# Patient Record
Sex: MALE | Race: BLACK OR AFRICAN AMERICAN | NOT HISPANIC OR LATINO | URBAN - METROPOLITAN AREA
[De-identification: names, ages, dates, MRNs, and addresses within clinical notes are randomized per-mention and may not be internally consistent; named-entity substitution may affect disease eponyms.]

---

## 2023-05-08 ENCOUNTER — EMERGENCY (EMERGENCY)
Facility: HOSPITAL | Age: 33
LOS: 1 days | Discharge: HOME | End: 2023-05-08
Admitting: EMERGENCY MEDICINE
Payer: MEDICAID

## 2023-05-08 VITALS
HEART RATE: 114 BPM | OXYGEN SATURATION: 97 % | SYSTOLIC BLOOD PRESSURE: 147 MMHG | TEMPERATURE: 99 F | DIASTOLIC BLOOD PRESSURE: 80 MMHG | RESPIRATION RATE: 18 BRPM

## 2023-05-08 VITALS
OXYGEN SATURATION: 94 % | DIASTOLIC BLOOD PRESSURE: 89 MMHG | RESPIRATION RATE: 20 BRPM | HEART RATE: 106 BPM | TEMPERATURE: 100 F | SYSTOLIC BLOOD PRESSURE: 165 MMHG | WEIGHT: 315 LBS

## 2023-05-08 LAB
HMPV RNA SPEC QL NAA+PROBE: DETECTED
RAPID RVP RESULT: DETECTED
SARS-COV-2 RNA SPEC QL NAA+PROBE: SIGNIFICANT CHANGE UP

## 2023-05-08 PROCEDURE — 71045 X-RAY EXAM CHEST 1 VIEW: CPT | Mod: 26

## 2023-05-08 PROCEDURE — 99284 EMERGENCY DEPT VISIT MOD MDM: CPT

## 2023-05-08 RX ORDER — LORATADINE 10 MG/1
10 TABLET ORAL ONCE
Refills: 0 | Status: COMPLETED | OUTPATIENT
Start: 2023-05-08 | End: 2023-05-08

## 2023-05-08 RX ORDER — IPRATROPIUM/ALBUTEROL SULFATE 18-103MCG
3 AEROSOL WITH ADAPTER (GRAM) INHALATION ONCE
Refills: 0 | Status: COMPLETED | OUTPATIENT
Start: 2023-05-08 | End: 2023-05-08

## 2023-05-08 RX ORDER — DEXAMETHASONE 0.5 MG/5ML
10 ELIXIR ORAL ONCE
Refills: 0 | Status: COMPLETED | OUTPATIENT
Start: 2023-05-08 | End: 2023-05-08

## 2023-05-08 RX ORDER — CEFPODOXIME PROXETIL 100 MG
100 TABLET ORAL ONCE
Refills: 0 | Status: COMPLETED | OUTPATIENT
Start: 2023-05-08 | End: 2023-05-08

## 2023-05-08 RX ORDER — CEFPODOXIME PROXETIL 100 MG
1 TABLET ORAL
Qty: 14 | Refills: 0
Start: 2023-05-08 | End: 2023-05-14

## 2023-05-08 RX ADMIN — Medication 100 MILLIGRAM(S): at 23:46

## 2023-05-08 RX ADMIN — Medication 3 MILLILITER(S): at 21:03

## 2023-05-08 RX ADMIN — LORATADINE 10 MILLIGRAM(S): 10 TABLET ORAL at 21:03

## 2023-05-08 RX ADMIN — Medication 10 MILLIGRAM(S): at 21:03

## 2023-05-08 NOTE — ED PROVIDER NOTE - PROGRESS NOTE DETAILS
Patient reports improvement in symptoms status post serial neb, IM Dex.  Ambulated in the ED without desaturation.  Reports feeling back to his baseline.  Repeat lung exam with improved airflow and no audible wheezing noted.

## 2023-05-08 NOTE — ED PROVIDER NOTE - CARE PROVIDER_API CALL
Alexei Albrecht)  Critical Care Medicine; Internal Medicine; Pulmonary Disease  [address] [place] [address]  Phone: [phone]  Fax: [phone]  Follow Up Time:     Nuvia Bell; MPH)  Internal Medicine  [address] [place] [address]  Phone: [phone]  Fax: [phone]  Follow Up Time:

## 2023-05-08 NOTE — ED ADULT TRIAGE NOTE - CHIEF COMPLAINT QUOTE
pt. c/o asthma exacerbation, pt. received 1 douneb with EMS report still feeling tight. Pt. endorses using his inhaler over 20 times today with no improvement. Pt. breathing unlabored and speaking in full sentences.

## 2023-05-08 NOTE — ED PROVIDER NOTE - PATIENT PORTAL LINK FT
You can access the FollowMyHealth Patient Portal offered by [facility] by registering at the following website: [email or web address]. By joining [employer]’s FollowMyHealth portal, you will also be able to view your health information using other applications (apps) compatible with our system.

## 2023-05-08 NOTE — ED PROVIDER NOTE - OBJECTIVE STATEMENT
34 yo M with PMHx of asthma, no h/o intubation, baseline peak flow unknown, last exacerbation 11/2022 with short hospitalization, seasonal allergies, morbidly obese, BIBA for worsening chest tightness, SOB, wheezing x 2d. Pt reports having gradual onset of cough productive of greenish sputum with associated wheezing, chest tightness. Has been using albuterol pump at home with minimal improvement. Denies fever, chills, wheezing, hemoptysis, CP, orthopnea, palpitations, peripheral edema, stridors, focal weakness, sore throat, tinnitus, HA, dizziness, N/V/D/C, abdominal pain, change in urinary/bowel function, night sweats, and malaise. s/p 2 doses of COVID vaccine, given 1 duoneb en route to the ED by EMS with improvement

## 2023-05-08 NOTE — ED PROVIDER NOTE - PHYSICAL EXAMINATION
Gen - WDWN M, BMI>40, NAD, comfortable and non-toxic appearing  Skin - warm, dry, intact   HEENT - AT/NC, PERRL, EOMI, no nasal discharge, airway patent, neck supple and FROM  CV - S1S2, R/R/R  Resp - mildly diminished airflow b/l with scant expiratory wheezing, no rales or accessory muscle use/tripoding   GI - soft, ND, NT, no CVAT b/l   MS - No acute or gross deformities noted to extremities. No midline spinal tenderness or step off on palpation  Neuro - AxOx3, ambulatory without gait disturbance

## 2023-05-08 NOTE — ED PROVIDER NOTE - NSFOLLOWUPINSTRUCTIONS_ED_ALL_ED_FT
Pneumonia    Pneumonia is an infection of the lungs. Pneumonia may be caused by bacteria, viruses, or funguses. Symptoms include coughing, fever, chest pain when breathing deeply or coughing, shortness of breath, fatigue, or muscle aches. Pneumonia can be diagnosed with a medical history and physical exam, as well as other tests which may include a chest X-ray. If you were prescribed an antibiotic medicine, take it as told by your health care provider and do not stop taking the antibiotic even if you start to feel better. Do not use tobacco products, including cigarettes, chewing tobacco, and e-cigarettes.    SEEK IMMEDIATE MEDICAL CARE IF YOU HAVE ANY OF THE FOLLOWING SYMPTOMS: worsening shortness of breath, worsening chest pain, coughing up blood, change in mental status, lightheadedness/dizziness.     Asthma    Asthma is a condition in which the airways tighten and narrow, making it difficult to breath. Asthma episodes, also called asthma attacks, range from minor to life-threatening. Symptoms include wheezing, coughing, chest tightness, or shortness of breath. The diagnosis of asthma is made by a review of your medical history and a physical exam, but may involve additional testing. Asthma cannot be cured, but medicines and lifestyle changes can help control it. Avoid triggers of asthma which may include animal dander, pollen, mold, smoke, air pollutants, etc.     SEEK IMMEDIATE MEDICAL CARE IF YOU HAVE ANY OF THE FOLLOWING SYMPTOMS: worsening of symptoms, shortness of breath at rest, chest pain, bluish discoloration to lips or fingertips, lightheadedness/dizziness, or fever.

## 2023-05-08 NOTE — ED PROVIDER NOTE - CLINICAL SUMMARY MEDICAL DECISION MAKING FREE TEXT BOX
abnormal Patient with few days of URI symptoms, subjective fever and now worsening chest tightness, wheezing.  Low-grade temp on arrival, diffuse wheezing on exam, status post serial nebs, Dex with marked improvement in symptoms.  Chest x-ray reviewed right upper lobe pneumonia, patient with no travel history or systemic symptoms or constitutional symptoms that are suggestive of TB or ZECHARIAH.  Will treat with course of cefpodoxime and doxycycline, Ambulated into the ED without desaturation, patient reports feeling markedly improved and back to his baseline. Based on my personal interpretation of pt's labs/images and entire work up during the ED stay, results, ddx, and f/u plans discussed in length with pt at bedside. AFVSS, pt non-toxic appearing and remained stable throughout the stay after ED interventions. D/c'd home to f/u with PMD and pulmonary, strict return precautions discussed, prompt return to ED for any worsening or new sx, pt verbalized understanding.

## 2023-05-09 LAB
FLUAV H1 2009 PAND RNA SPEC QL NAA+PROBE: SIGNIFICANT CHANGE UP
FLUAV H1 RNA SPEC QL NAA+PROBE: SIGNIFICANT CHANGE UP
FLUAV H3 RNA SPEC QL NAA+PROBE: SIGNIFICANT CHANGE UP
FLUAV SUBTYP SPEC NAA+PROBE: SIGNIFICANT CHANGE UP
FLUBV RNA SPEC QL NAA+PROBE: SIGNIFICANT CHANGE UP

## 2023-05-10 DIAGNOSIS — Z20.822 CONTACT WITH AND (SUSPECTED) EXPOSURE TO COVID-19: ICD-10-CM

## 2023-05-10 DIAGNOSIS — J18.9 PNEUMONIA, UNSPECIFIED ORGANISM: ICD-10-CM

## 2023-05-10 DIAGNOSIS — J45.909 UNSPECIFIED ASTHMA, UNCOMPLICATED: ICD-10-CM

## 2023-05-10 DIAGNOSIS — R06.02 SHORTNESS OF BREATH: ICD-10-CM

## 2023-05-10 DIAGNOSIS — E66.01 MORBID (SEVERE) OBESITY DUE TO EXCESS CALORIES: ICD-10-CM

## 2023-06-20 ENCOUNTER — EMERGENCY (EMERGENCY)
Facility: HOSPITAL | Age: 33
LOS: 1 days | Discharge: HOME | End: 2023-06-20
Admitting: EMERGENCY MEDICINE
Payer: MEDICAID

## 2023-06-20 VITALS
TEMPERATURE: 97 F | SYSTOLIC BLOOD PRESSURE: 137 MMHG | HEIGHT: 71 IN | DIASTOLIC BLOOD PRESSURE: 90 MMHG | WEIGHT: 315 LBS | HEART RATE: 106 BPM | OXYGEN SATURATION: 99 % | RESPIRATION RATE: 20 BRPM

## 2023-06-20 VITALS
SYSTOLIC BLOOD PRESSURE: 143 MMHG | RESPIRATION RATE: 18 BRPM | OXYGEN SATURATION: 97 % | DIASTOLIC BLOOD PRESSURE: 64 MMHG | HEART RATE: 100 BPM

## 2023-06-20 DIAGNOSIS — Z91.018 ALLERGY TO OTHER FOODS: ICD-10-CM

## 2023-06-20 DIAGNOSIS — J45.901 UNSPECIFIED ASTHMA WITH (ACUTE) EXACERBATION: ICD-10-CM

## 2023-06-20 DIAGNOSIS — R06.02 SHORTNESS OF BREATH: ICD-10-CM

## 2023-06-20 DIAGNOSIS — E87.6 HYPOKALEMIA: ICD-10-CM

## 2023-06-20 PROBLEM — J45.909 UNSPECIFIED ASTHMA, UNCOMPLICATED: Chronic | Status: ACTIVE | Noted: 2023-05-08

## 2023-06-20 PROBLEM — J30.2 OTHER SEASONAL ALLERGIC RHINITIS: Chronic | Status: ACTIVE | Noted: 2023-05-08

## 2023-06-20 LAB
ALBUMIN SERPL ELPH-MCNC: 3.6 G/DL — SIGNIFICANT CHANGE UP (ref 3.4–5)
ALP SERPL-CCNC: 83 U/L — SIGNIFICANT CHANGE UP (ref 40–120)
ALT FLD-CCNC: 21 U/L — SIGNIFICANT CHANGE UP (ref 12–42)
ANION GAP SERPL CALC-SCNC: 11 MMOL/L — SIGNIFICANT CHANGE UP (ref 9–16)
AST SERPL-CCNC: 15 U/L — SIGNIFICANT CHANGE UP (ref 15–37)
BASOPHILS # BLD AUTO: 0.07 K/UL — SIGNIFICANT CHANGE UP (ref 0–0.2)
BASOPHILS NFR BLD AUTO: 0.7 % — SIGNIFICANT CHANGE UP (ref 0–2)
BILIRUB SERPL-MCNC: 0.3 MG/DL — SIGNIFICANT CHANGE UP (ref 0.2–1.2)
BUN SERPL-MCNC: 15 MG/DL — SIGNIFICANT CHANGE UP (ref 7–23)
CALCIUM SERPL-MCNC: 8.5 MG/DL — SIGNIFICANT CHANGE UP (ref 8.5–10.5)
CHLORIDE SERPL-SCNC: 106 MMOL/L — SIGNIFICANT CHANGE UP (ref 96–108)
CO2 SERPL-SCNC: 27 MMOL/L — SIGNIFICANT CHANGE UP (ref 22–31)
CREAT SERPL-MCNC: 1.01 MG/DL — SIGNIFICANT CHANGE UP (ref 0.5–1.3)
EGFR: 99 ML/MIN/1.73M2 — SIGNIFICANT CHANGE UP
EOSINOPHIL # BLD AUTO: 0.78 K/UL — HIGH (ref 0–0.5)
EOSINOPHIL NFR BLD AUTO: 8 % — HIGH (ref 0–6)
GLUCOSE SERPL-MCNC: 112 MG/DL — HIGH (ref 70–99)
HCT VFR BLD CALC: 37.1 % — LOW (ref 39–50)
HGB BLD-MCNC: 10.9 G/DL — LOW (ref 13–17)
IMM GRANULOCYTES NFR BLD AUTO: 0.5 % — SIGNIFICANT CHANGE UP (ref 0–0.9)
LYMPHOCYTES # BLD AUTO: 2.56 K/UL — SIGNIFICANT CHANGE UP (ref 1–3.3)
LYMPHOCYTES # BLD AUTO: 26.4 % — SIGNIFICANT CHANGE UP (ref 13–44)
MCHC RBC-ENTMCNC: 24.8 PG — LOW (ref 27–34)
MCHC RBC-ENTMCNC: 29.4 GM/DL — LOW (ref 32–36)
MCV RBC AUTO: 84.3 FL — SIGNIFICANT CHANGE UP (ref 80–100)
MONOCYTES # BLD AUTO: 1.16 K/UL — HIGH (ref 0–0.9)
MONOCYTES NFR BLD AUTO: 12 % — SIGNIFICANT CHANGE UP (ref 2–14)
NEUTROPHILS # BLD AUTO: 5.08 K/UL — SIGNIFICANT CHANGE UP (ref 1.8–7.4)
NEUTROPHILS NFR BLD AUTO: 52.4 % — SIGNIFICANT CHANGE UP (ref 43–77)
NRBC # BLD: 0 /100 WBCS — SIGNIFICANT CHANGE UP (ref 0–0)
PCO2 BLDV: 48 MMHG — SIGNIFICANT CHANGE UP (ref 42–55)
PH BLDV: 7.4 — SIGNIFICANT CHANGE UP (ref 7.32–7.43)
PLATELET # BLD AUTO: 295 K/UL — SIGNIFICANT CHANGE UP (ref 150–400)
PO2 BLDV: 46 MMHG — HIGH (ref 25–45)
POTASSIUM SERPL-MCNC: 3.1 MMOL/L — LOW (ref 3.5–5.3)
POTASSIUM SERPL-SCNC: 3.1 MMOL/L — LOW (ref 3.5–5.3)
PROT SERPL-MCNC: 7.5 G/DL — SIGNIFICANT CHANGE UP (ref 6.4–8.2)
RBC # BLD: 4.4 M/UL — SIGNIFICANT CHANGE UP (ref 4.2–5.8)
RBC # FLD: 15.4 % — HIGH (ref 10.3–14.5)
SAO2 % BLDV: 75.2 % — SIGNIFICANT CHANGE UP (ref 67–88)
SODIUM SERPL-SCNC: 144 MMOL/L — SIGNIFICANT CHANGE UP (ref 132–145)
WBC # BLD: 9.7 K/UL — SIGNIFICANT CHANGE UP (ref 3.8–10.5)
WBC # FLD AUTO: 9.7 K/UL — SIGNIFICANT CHANGE UP (ref 3.8–10.5)

## 2023-06-20 PROCEDURE — 99284 EMERGENCY DEPT VISIT MOD MDM: CPT

## 2023-06-20 RX ORDER — POTASSIUM CHLORIDE 20 MEQ
40 PACKET (EA) ORAL ONCE
Refills: 0 | Status: COMPLETED | OUTPATIENT
Start: 2023-06-20 | End: 2023-06-20

## 2023-06-20 RX ORDER — LORATADINE 10 MG/1
10 TABLET ORAL ONCE
Refills: 0 | Status: COMPLETED | OUTPATIENT
Start: 2023-06-20 | End: 2023-06-20

## 2023-06-20 RX ORDER — ALBUTEROL 90 UG/1
2 AEROSOL, METERED ORAL
Qty: 1 | Refills: 0
Start: 2023-06-20

## 2023-06-20 RX ORDER — FLUTICASONE PROPIONATE AND SALMETEROL 50; 250 UG/1; UG/1
1 POWDER ORAL; RESPIRATORY (INHALATION)
Qty: 1 | Refills: 0
Start: 2023-06-20

## 2023-06-20 RX ORDER — IPRATROPIUM/ALBUTEROL SULFATE 18-103MCG
3 AEROSOL WITH ADAPTER (GRAM) INHALATION ONCE
Refills: 0 | Status: COMPLETED | OUTPATIENT
Start: 2023-06-20 | End: 2023-06-20

## 2023-06-20 RX ORDER — LEVOCETIRIZINE DIHYDROCHLORIDE 0.5 MG/ML
1 SOLUTION ORAL
Qty: 7 | Refills: 0
Start: 2023-06-20 | End: 2023-06-26

## 2023-06-20 RX ADMIN — Medication 40 MILLIEQUIVALENT(S): at 01:30

## 2023-06-20 RX ADMIN — LORATADINE 10 MILLIGRAM(S): 10 TABLET ORAL at 00:31

## 2023-06-20 RX ADMIN — Medication 3 MILLILITER(S): at 00:21

## 2023-06-20 RX ADMIN — Medication 0.25 MILLIGRAM(S): at 00:31

## 2023-06-20 RX ADMIN — Medication 3 MILLILITER(S): at 00:32

## 2023-06-20 NOTE — ED ADULT NURSE REASSESSMENT NOTE - NS ED NURSE REASSESS COMMENT FT1
Pt reports improvement of condition, states "I feel less tight in my chest". Nebulizer treatment completed. Cardiac and pulse oximetry monitoring maintained.

## 2023-06-20 NOTE — ED PROVIDER NOTE - CARE PROVIDER_API CALL
Alxeei Albrecht [facility]  Pulmonary Disease  [address] [place] [address], [place] [address]  Phone: [phone]  Fax: [phone]  Follow Up Time:

## 2023-06-20 NOTE — ED ADULT NURSE NOTE - NSFALLUNIVINTERV_ED_ALL_ED
Bed/Stretcher in lowest position, wheels locked, appropriate side rails in place/Call bell, personal items and telephone in reach/Instruct patient to call for assistance before getting out of bed/chair/stretcher/Non-slip footwear applied when patient is off stretcher/[place] to call system/Physically safe environment - no spills, clutter or unnecessary equipment/Purposeful proactive rounding/Room/bathroom lighting operational, light cord in reach

## 2023-06-20 NOTE — ED PROVIDER NOTE - CARE PLAN
1 Principal Discharge DX:	Asthma exacerbation   Principal Discharge DX:	Asthma exacerbation  Secondary Diagnosis:	Hypokalemia

## 2023-06-20 NOTE — ED PROVIDER NOTE - OBJECTIVE STATEMENT
32 yo M with PMHx of asthma, no h/o intubation, baseline peak flow unknown, last exacerbation 3 months ago without hospitalization, BIBA for SOB and wheezing. Pt reports sudden onset of chest tightness, wheezing and difficulty breathing tonight. Attempted alleviation of albuterol inhaler without relief and called EMS. Given 2 duo nebs, dex 12mg, and 2mg of Mg en route to the ED with improvement. Denies fever, chills, hemoptysis, CP, orthopnea, palpitations, peripheral edema, stridors, focal weakness, sore throat, tinnitus, HA, dizziness, N/V/D/C, abdominal pain, change in urinary/bowel function, night sweats, and malaise. No recent travel or sick contact noted

## 2023-06-20 NOTE — ED PROVIDER NOTE - PHYSICAL EXAMINATION
Gen - Obese M, slightly tachypneic, comfortable and non-toxic appearing, speaking in full sentences   Skin - warm, dry, intact   HEENT - AT/NC, PERRL, no conjunctival injection, no nasal discharge, airway patent, neck supple and FROM  CV - S1S2, R/R/R  Resp - slightly diminished bs b/l with expiratory and inspiratory wheezing, no r/r, no tripoding or accessory muscle use   GI - soft, ND, NT, no CVAT b/l   MS - No acute or gross deformities noted to extremities. No midline spinal tenderness or step off on palpation  Neuro - AxOx3, no focal neuro deficits, ambulatory without gait disturbance

## 2023-06-20 NOTE — ED ADULT NURSE NOTE - OBJECTIVE STATEMENT
Pt is a 33y male c/o asthma exacerbation. Pt states he kept coughing and was unable to catch his breath despite using his inhaler, he began to feel lightheaded then called EMS. PTA, EMS placed IV in LAC, gave 2 dounebs, 12mg decadron, and 2grams of magnesium. Pt states on arrival to ED he was already feeling better, with some chest tightness remaining. Pt arrived with nebulizer treatment in progress, speaking in complete sentences. PMH asthma, no reported hx of intubations.

## 2023-06-20 NOTE — ED PROVIDER NOTE - PROGRESS NOTE DETAILS
reports feeling better, scant wheezing on exam, AFVSS, currently awaiting labs ambulatory in the ED without desaturation, repeat lung exam CTAB, reports feeling back to baseline and desires to go home.

## 2023-06-20 NOTE — ED ADULT TRIAGE NOTE - CHIEF COMPLAINT QUOTE
pt. notification for asthma exacerbation. Reports coughing started about 1 hr ago while on the train and he began feeling SOB with no improvement from his albuterol. Pt. given 2 dounebs, 12mg decadron, and 2grams of magnesium from EMS. Pt. appears well speaking in full sentences but endorses some chest tightness.

## 2023-06-20 NOTE — ED PROVIDER NOTE - CLINICAL SUMMARY MEDICAL DECISION MAKING FREE TEXT BOX
pt with chest tightness, wheezing and sob since few hours ago. Noted diffuse inspiratory and expiratory wheezing on exam, non hypoxic, moving air well s/p serial nebs, prehospital dex/Mg. Additional terbutaline given in the ED and pt monitored with improvement in sx and ambulated in the ED without desaturation noted. labs unremarkable except for mild hypokalemia likely 2/2 serial albuterol tx, s/p po supplementation. Based on my personal interpretation of pt's labs/images and entire work up during the ED stay, results, ddx, and f/u plans discussed in length with pt at bedside. AFVSS, pt non-toxic appearing and remained stable throughout the stay after ED interventions. D/c'd home to f/u with pulmonary/PMD, strict return precautions discussed, prompt return to ED for any worsening or new sx, pt verbalized understanding.

## 2023-06-20 NOTE — ED PROVIDER NOTE - PATIENT PORTAL LINK FT
You can access the FollowMyHealth Patient Portal offered by [facility] by registering at the following website: [email or web address]. By joining [employer]’s FollowMyHealth portal, you will also be able to view your health information using other applications (apps) compatible with our system.

## 2023-07-11 ENCOUNTER — APPOINTMENT (OUTPATIENT)
Dept: RADIOLOGY | Facility: CLINIC | Age: 33
End: 2023-07-11
Payer: MEDICAID

## 2023-07-11 ENCOUNTER — APPOINTMENT (OUTPATIENT)
Dept: PULMONOLOGY | Facility: CLINIC | Age: 33
End: 2023-07-11
Payer: MEDICAID

## 2023-07-11 ENCOUNTER — OUTPATIENT (OUTPATIENT)
Dept: OUTPATIENT SERVICES | Facility: HOSPITAL | Age: 33
LOS: 1 days | End: 2023-07-11

## 2023-07-11 VITALS
DIASTOLIC BLOOD PRESSURE: 76 MMHG | OXYGEN SATURATION: 98 % | WEIGHT: 315 LBS | BODY MASS INDEX: 44.1 KG/M2 | HEART RATE: 113 BPM | SYSTOLIC BLOOD PRESSURE: 131 MMHG | HEIGHT: 71 IN | TEMPERATURE: 98.8 F

## 2023-07-11 PROCEDURE — 94727 GAS DIL/WSHOT DETER LNG VOL: CPT

## 2023-07-11 PROCEDURE — 99204 OFFICE O/P NEW MOD 45 MIN: CPT | Mod: 25

## 2023-07-11 PROCEDURE — ZZZZZ: CPT

## 2023-07-11 PROCEDURE — 71046 X-RAY EXAM CHEST 2 VIEWS: CPT | Mod: 26

## 2023-07-11 PROCEDURE — 94060 EVALUATION OF WHEEZING: CPT

## 2023-07-11 PROCEDURE — 94729 DIFFUSING CAPACITY: CPT

## 2023-07-11 NOTE — PHYSICAL EXAM
[No Acute Distress] : no acute distress [Normal Appearance] : normal appearance [Normal Rate/Rhythm] : normal rate/rhythm [Normal S1, S2] : normal s1, s2 [No Resp Distress] : no resp distress [Clear to Auscultation Bilaterally] : clear to auscultation bilaterally [Oriented x3] : oriented x3 [Normal Affect] : normal affect [Erythema] : erythema [Nasal congestion] : nasal congestion [Turbinate hypertrophy] : turbinate hypertrophy

## 2023-07-12 NOTE — HISTORY OF PRESENT ILLNESS
[Never] : never [TextBox_4] : 34 yo M, never smoker, with PMHx of asthma, no h/o intubation, recently seen in the ED at [facility] 5/23 for PNA and asthma exacerbation, here to establish care for management of asthma. During hospitalization, respiratory viral panel was positive for hMPV and CXR done showed RUL infiltrate. Given dexamethasone and duonebs, treated for CAP with course of cefpodoxime/doxy. Patient is a . Notes allergies to pollen. Takes Flonase once daily and Zyrtec. No dogs/cats. Currently living at homeless shelter- walls are moldy, many people smoking which he feels may be triggering asthma. Since January, states that his asthma has worsened. Used albuterol nebulizer twice in past week, albuterol inhaler about 10 x in the past week. Does not have a maintenance inhaler. Had PNA when he was 22; states he developed asthma after that time.  No GERD.

## 2023-07-12 NOTE — REVIEW OF SYSTEMS
120 [Nasal Congestion] : nasal congestion [Postnasal Drip] : postnasal drip [Chest Tightness] : chest tightness [Dyspnea] : dyspnea [Wheezing] : wheezing [Fever] : no fever [Chills] : no chills [Dry Eyes] : no dry eyes [Eye Irritation] : no eye irritation [Cough] : no cough [Sputum] : no sputum [SOB on Exertion] : no sob on exertion [Chest Discomfort] : no chest discomfort [Edema] : no edema [Orthopnea] : no orthopnea [Hay Fever] : no hay fever [GERD] : no gerd

## 2023-07-12 NOTE — DISCUSSION/SUMMARY
[FreeTextEntry1] : 32 yo M, never smoker, with PMHx of asthma, recent [facility] ED visit [facility] 5/23 for PNA and asthma exacerbation, currently living in homeless shelter with possible mold/cigarette smoke exposures, here to establish care for management of asthma. \par \par Reviewed\par [facility] ED notes 5/23\par PFT (7/11/23): FEV 1 54, FEV1/FVC 64, TLC 74 , DLCO 78  , REV -2\par 6/20/23: Bicarb 27, Eosinophilia 8%, WBC 9.70\par CXR 5/23:Single frontal view of the chest demonstrates right upper lobe infiltrate. The cardiomediastinal silhouette is normal. No acute osseous abnormalities. \par CXR 7/11/23: Interval resolution of previous focal consolidation within RUL \par \par A/P: \par (1) Asthma: Currently uncontrolled, using albuterol inhaler 10x per week.\par - Start Breo 1 puff daily with gargle \par - Continue Flonase, advised to increase to BID \par - Continue albuterol inhaler and nebulizer PRN\par \par (2) RUL infiltrate; Patient seen at [facility] ED 5/23 for PNA, CXR at the time with RUL infiltrate that has resolved on repeat CXR today. Respiratory viral panel done at the time was positive for hMPV and patient completed course of cefpodoxime/doxy for CAP. \par \par Follow-up in 1 month with Dr. Birmingham

## 2023-08-07 NOTE — DISCUSSION/SUMMARY
[FreeTextEntry1] : 32 yo M, never smoker, with PMHx of asthma, recent [facility] ED visit [facility] 5/23 for PNA and asthma exacerbation, currently living in homeless shelter with possible mold/cigarette smoke exposures, here to establish care for management of asthma.  Reviewed [facility] ED notes 5/23 PFT (7/11/23): FEV 1 54, FEV1/FVC 64, TLC 74 , DLCO 78 , REV -2 6/20/23: Bicarb 27, Eosinophilia 8%, WBC 9.70 CXR 5/23:Single frontal view of the chest demonstrates right upper lobe infiltrate. The cardiomediastinal silhouette is normal. No acute osseous abnormalities. CXR 7/11/23: Interval resolution of previous focal consolidation within RUL  A/P: (1) Asthma: Currently uncontrolled, using albuterol inhaler 10x per week. - Start Breo 1 puff daily with gargle - Continue Flonase, advised to increase to BID - Continue albuterol inhaler and nebulizer PRN  (2) RUL infiltrate; Patient seen at [facility] ED 5/23 for PNA, CXR at the time with RUL infiltrate that has resolved on repeat CXR today. Respiratory viral panel done at the time was positive for hMPV and patient completed course of cefpodoxime/doxy for CAP.  Follow-up in 1 month with Dr. Birmingham.

## 2023-08-09 ENCOUNTER — APPOINTMENT (OUTPATIENT)
Dept: PULMONOLOGY | Facility: CLINIC | Age: 33
End: 2023-08-09
Payer: MEDICAID

## 2023-08-09 VITALS
HEIGHT: 71 IN | OXYGEN SATURATION: 90 % | BODY MASS INDEX: 44.1 KG/M2 | DIASTOLIC BLOOD PRESSURE: 78 MMHG | WEIGHT: 315 LBS | SYSTOLIC BLOOD PRESSURE: 139 MMHG | HEART RATE: 97 BPM | TEMPERATURE: 98.6 F

## 2023-08-09 DIAGNOSIS — Z87.01 PERSONAL HISTORY OF PNEUMONIA (RECURRENT): ICD-10-CM

## 2023-08-09 PROCEDURE — 99214 OFFICE O/P EST MOD 30 MIN: CPT

## 2023-08-09 NOTE — REVIEW OF SYSTEMS
[Nasal Congestion] : nasal congestion [Postnasal Drip] : postnasal drip [Chest Tightness] : chest tightness [Wheezing] : wheezing [Negative] : Endocrine [Fever] : no fever [Chills] : no chills [Dry Eyes] : no dry eyes [Eye Irritation] : no eye irritation [Cough] : no cough [Sputum] : no sputum [SOB on Exertion] : no sob on exertion [Chest Discomfort] : no chest discomfort [Edema] : no edema [Orthopnea] : no orthopnea [Hay Fever] : no hay fever [GERD] : no gerd

## 2023-08-09 NOTE — ASSESSMENT
[FreeTextEntry1] : Reviewed [facility] ED notes 5/23 PFT (7/11/23): FEV 1 54, FEV1/FVC 64, TLC 74, DLCO 78, REV -2 6/20/23: Bicarb 27, Eosinophilia 8%, WBC 9.70 CXR 5/23: Single frontal view of the chest demonstrates right upper lobe infiltrate. The cardiomediastinal silhouette is normal. No acute osseous abnormalities. CXR 7/11/23: Interval resolution of previous focal consolidation within RUL  32 yo M, never smoker, with PMHx of asthma, who presented to establish care for asthma after an ED visit [facility] 5/23 for PNA and asthma exacerbation. Asthma is well controlled.  A/P: (1) Mild persistent asthma- currently well controlled - Presented to us in exacerbation 2/2 hMPV and CAP in 5/2023 - Continue Breo 1 puff daily with gargle - Continue Flonase BID - Continue albuterol inhaler and nebulizer PRN - Will plan to repeat PFTs 11/2023 and consider step down therapy at that time, also consider repeat eosinophils at that time  (2) RUL infiltrate 5/2023 resolved on imaging 7/11/2023- in setting of hMPV and CAP.  Follow-up in 3 months.

## 2023-08-09 NOTE — HISTORY OF PRESENT ILLNESS
[Never] : never [TextBox_4] : 34 yo M, never smoker, with PMHx of asthma, no h/o intubation, recently seen in the ED at [facility] 5/23 for PNA and asthma exacerbation, here to establish care for management of asthma. During hospitalization, respiratory viral panel was positive for hMPV and CXR done showed RUL infiltrate. Given dexamethasone and duonebs, treated for CAP with course of cefpodoxime/doxy. Patient is a . Notes allergies to pollen. Takes Flonase once daily and Zyrtec. No dogs/cats. Currently living at homeless shelter- walls are moldy, many people smoking which he feels may be triggering asthma. Since January, states that his asthma has worsened. Used albuterol nebulizer twice in past week, albuterol inhaler about 10 x in the past week. Does not have a maintenance inhaler. Had PNA when he was 22; states he developed asthma after that time. No GERD.    8/9/23: Feels well. Using Breo daily. Rinsing afterwards. No exacerbations since last visit. Has required his albuterol nebulizer only once. Further history suggesting his triggers are cold air, season changes, animal and cat dander, cigarette smoke. He is using his Flonase daily. Montelukast infrequently.  [ESS] : 0

## 2023-08-09 NOTE — PHYSICAL EXAM
[No Acute Distress] : no acute distress [Normal Oropharynx] : normal oropharynx [Normal Appearance] : normal appearance [Normal Rate/Rhythm] : normal rate/rhythm [Normal S1, S2] : normal s1, s2 [No Resp Distress] : no resp distress [Clear to Auscultation Bilaterally] : clear to auscultation bilaterally [No Clubbing] : no clubbing [No Edema] : no edema [Oriented x3] : oriented x3 [Normal Affect] : normal affect [Normal Gait] : normal gait [No Acc Muscle Use] : no acc muscle use

## 2023-11-13 ENCOUNTER — APPOINTMENT (OUTPATIENT)
Dept: PULMONOLOGY | Facility: CLINIC | Age: 33
End: 2023-11-13
Payer: MEDICAID

## 2023-11-13 VITALS
TEMPERATURE: 97.5 F | HEIGHT: 70 IN | DIASTOLIC BLOOD PRESSURE: 87 MMHG | HEART RATE: 83 BPM | OXYGEN SATURATION: 93 % | SYSTOLIC BLOOD PRESSURE: 139 MMHG | WEIGHT: 315 LBS | BODY MASS INDEX: 45.1 KG/M2

## 2023-11-13 PROCEDURE — 94729 DIFFUSING CAPACITY: CPT

## 2023-11-13 PROCEDURE — 94726 PLETHYSMOGRAPHY LUNG VOLUMES: CPT

## 2023-11-13 PROCEDURE — 94060 EVALUATION OF WHEEZING: CPT

## 2023-11-13 PROCEDURE — 99214 OFFICE O/P EST MOD 30 MIN: CPT | Mod: 25

## 2023-11-13 RX ORDER — FLUTICASONE PROPIONATE 50 UG/1
50 SPRAY, METERED NASAL TWICE DAILY
Qty: 1 | Refills: 5 | Status: ACTIVE | COMMUNITY
Start: 2023-07-11 | End: 1900-01-01

## 2024-05-06 NOTE — PHYSICAL EXAM
[No Acute Distress] : no acute distress [Normal Appearance] : normal appearance [No Resp Distress] : no resp distress [Rhonchi] : rhonchi [Normal Gait] : normal gait [Oriented x3] : oriented x3 [Normal Affect] : normal affect

## 2024-05-13 ENCOUNTER — APPOINTMENT (OUTPATIENT)
Dept: PULMONOLOGY | Facility: CLINIC | Age: 34
End: 2024-05-13
Payer: MEDICAID

## 2024-05-13 VITALS
OXYGEN SATURATION: 97 % | HEART RATE: 96 BPM | BODY MASS INDEX: 45.1 KG/M2 | DIASTOLIC BLOOD PRESSURE: 69 MMHG | TEMPERATURE: 98.6 F | WEIGHT: 315 LBS | HEIGHT: 70 IN | SYSTOLIC BLOOD PRESSURE: 129 MMHG

## 2024-05-13 PROCEDURE — 99214 OFFICE O/P EST MOD 30 MIN: CPT

## 2024-05-13 RX ORDER — FLUTICASONE FUROATE AND VILANTEROL TRIFENATATE 200; 25 UG/1; UG/1
200-25 POWDER RESPIRATORY (INHALATION)
Qty: 2 | Refills: 2 | Status: ACTIVE | COMMUNITY
Start: 2023-07-11 | End: 1900-01-01

## 2024-05-13 NOTE — REVIEW OF SYSTEMS
[Negative] : Endocrine [Nasal Congestion] : no nasal congestion [Postnasal Drip] : no postnasal drip [Cough] : no cough [Chest Tightness] : no chest tightness [Sputum] : no sputum [Dyspnea] : no dyspnea [Wheezing] : no wheezing [SOB on Exertion] : no sob on exertion [GERD] : no gerd

## 2024-05-13 NOTE — HISTORY OF PRESENT ILLNESS
[Never] : never [TextBox_4] : 32 yo M, never smoker, with PMHx of asthma, no h/o intubation, recently seen in the ED at [facility] 5/23 for PNA and asthma exacerbation, here to establish care for management of asthma. During hospitalization, respiratory viral panel was positive for hMPV and CXR done showed RUL infiltrate. Given dexamethasone and duonebs, treated for CAP with course of cefpodoxime/doxy. Patient is a . Notes allergies to pollen. Takes Flonase once daily and Zyrtec. No dogs/cats. Currently living at homeless shelter- walls are moldy, many people smoking which he feels may be triggering asthma. Since January, states that his asthma has worsened. Used albuterol nebulizer twice in past week, albuterol inhaler about 10 x in the past week. Does not have a maintenance inhaler. Had PNA when he was 22; states he developed asthma after that time. No GERD.  8/9/2023 Feels well. Using Breo daily. Rinsing afterwards. No exacerbations since last visit. Has required his albuterol nebulizer only once. Further history suggesting his triggers are cold air, season changes, animal and cat dander, cigarette smoke. He is using his Flonase daily. Montelukast infrequently.  11/13/2023 PFTs today. Significant improvement in asthma symptoms since starting Breo. Moving out of shelter soon.   5/13/2024 No exacerbations. Has been using Breo on/off intermittently.  [ESS] : 0

## 2024-05-13 NOTE — ASSESSMENT
[FreeTextEntry1] : Reviewed [facility] ED notes 5/23 PFT (7/11/23): FEV 1 54, FEV1/FVC 64, TLC 74, DLCO 78, REV -2 6/20/23: Bicarb 27, Eosinophilia 8%, WBC 9.70 CXR 5/23: Single frontal view of the chest demonstrates right upper lobe infiltrate. The cardiomediastinal silhouette is normal. No acute osseous abnormalities. CXR 7/11/23: Interval resolution of previous focal consolidation within RUL PFTs (11/13/23): FEV 1 62, FEV1/FVC 64, TLC 56, DLCO 73  33 yo M, never smoker, with PMHx of asthma, who presented to establish care for asthma after an ED visit [facility] 5/23 for PNA and asthma exacerbation.   A/P: (1) Mild persistent asthma - Presented to us in exacerbation 2/2 hMPV and CAP in 5/2023 - Should resume daily use of Breo given increased PRN use as of late; gargle after use - Continue albuterol inhaler and nebulizer PRN  (2) RUL infiltrate 5/2023 resolved on imaging 7/11/2023- in setting of hMPV and CAP.  Follow-up in 6 months.

## 2024-06-04 ENCOUNTER — APPOINTMENT (OUTPATIENT)
Dept: INTERNAL MEDICINE | Facility: CLINIC | Age: 34
End: 2024-06-04
Payer: MEDICAID

## 2024-06-04 VITALS
OXYGEN SATURATION: 98 % | TEMPERATURE: 97.4 F | HEIGHT: 70 IN | BODY MASS INDEX: 45.1 KG/M2 | HEART RATE: 81 BPM | RESPIRATION RATE: 18 BRPM | DIASTOLIC BLOOD PRESSURE: 75 MMHG | SYSTOLIC BLOOD PRESSURE: 122 MMHG | WEIGHT: 315 LBS

## 2024-06-04 DIAGNOSIS — R91.8 OTHER NONSPECIFIC ABNORMAL FINDING OF LUNG FIELD: ICD-10-CM

## 2024-06-04 DIAGNOSIS — J45.909 UNSPECIFIED ASTHMA, UNCOMPLICATED: ICD-10-CM

## 2024-06-04 DIAGNOSIS — Z80.3 FAMILY HISTORY OF MALIGNANT NEOPLASM OF BREAST: ICD-10-CM

## 2024-06-04 DIAGNOSIS — Z82.49 FAMILY HISTORY OF ISCHEMIC HEART DISEASE AND OTHER DISEASES OF THE CIRCULATORY SYSTEM: ICD-10-CM

## 2024-06-04 DIAGNOSIS — Z23 ENCOUNTER FOR IMMUNIZATION: ICD-10-CM

## 2024-06-04 DIAGNOSIS — Z80.42 FAMILY HISTORY OF MALIGNANT NEOPLASM OF PROSTATE: ICD-10-CM

## 2024-06-04 PROCEDURE — 99385 PREV VISIT NEW AGE 18-39: CPT | Mod: 25

## 2024-06-04 PROCEDURE — 90715 TDAP VACCINE 7 YRS/> IM: CPT

## 2024-06-04 PROCEDURE — 90471 IMMUNIZATION ADMIN: CPT

## 2024-06-04 RX ORDER — SEMAGLUTIDE 0.25 MG/.5ML
0.25 INJECTION, SOLUTION SUBCUTANEOUS
Qty: 1 | Refills: 0 | Status: ACTIVE | COMMUNITY
Start: 2024-06-04 | End: 1900-01-01

## 2024-06-04 NOTE — PHYSICAL EXAM
[No Acute Distress] : no acute distress [Normal Sclera/Conjunctiva] : normal sclera/conjunctiva [PERRL] : pupils equal round and reactive to light [EOMI] : extraocular movements intact [Normal Outer Ear/Nose] : the outer ears and nose were normal in appearance [Normal Oropharynx] : the oropharynx was normal [No JVD] : no jugular venous distention [No Lymphadenopathy] : no lymphadenopathy [Supple] : supple [Thyroid Normal, No Nodules] : the thyroid was normal and there were no nodules present [No Respiratory Distress] : no respiratory distress  [No Accessory Muscle Use] : no accessory muscle use [Clear to Auscultation] : lungs were clear to auscultation bilaterally [Normal Rate] : normal rate  [Regular Rhythm] : with a regular rhythm [Normal S1, S2] : normal S1 and S2 [No Murmur] : no murmur heard [No Carotid Bruits] : no carotid bruits [No Abdominal Bruit] : a ~M bruit was not heard ~T in the abdomen [No Varicosities] : no varicosities [Pedal Pulses Present] : the pedal pulses are present [No Edema] : there was no peripheral edema [No Palpable Aorta] : no palpable aorta [No Extremity Clubbing/Cyanosis] : no extremity clubbing/cyanosis [Soft] : abdomen soft [Non Tender] : non-tender [Non-distended] : non-distended [No Masses] : no abdominal mass palpated [No HSM] : no HSM [Normal Bowel Sounds] : normal bowel sounds [Normal Posterior Cervical Nodes] : no posterior cervical lymphadenopathy [No CVA Tenderness] : no CVA  tenderness [Normal Anterior Cervical Nodes] : no anterior cervical lymphadenopathy [No Spinal Tenderness] : no spinal tenderness [No Joint Swelling] : no joint swelling [Grossly Normal Strength/Tone] : grossly normal strength/tone [No Rash] : no rash [Coordination Grossly Intact] : coordination grossly intact [No Focal Deficits] : no focal deficits [Normal Gait] : normal gait [Deep Tendon Reflexes (DTR)] : deep tendon reflexes were 2+ and symmetric [Normal Affect] : the affect was normal [Normal Insight/Judgement] : insight and judgment were intact

## 2024-06-04 NOTE — HEALTH RISK ASSESSMENT
[Good] : ~his/her~  mood as  good [0] : 2) Feeling down, depressed, or hopeless: Not at all (0) [Never] : Never [[id number]] : 0

## 2024-06-04 NOTE — HISTORY OF PRESENT ILLNESS
[FreeTextEntry1] : No new complains.  [de-identified] : 34 yrs old M with pmx of asthma, lung infiltrate, obesity comes in to establish care and for annual exam. Asthma: follows with Pulm, last seen 05/2024, on inhaler breo Ellipta and as needed albuterol.  says asthma is controlled these days. No other complains.  sleep- good appetite- good mood- good  covid- 2 doses tdap- today STD- today skin-  no lesions.

## 2024-06-12 DIAGNOSIS — E55.9 VITAMIN D DEFICIENCY, UNSPECIFIED: ICD-10-CM

## 2024-06-12 DIAGNOSIS — D64.9 ANEMIA, UNSPECIFIED: ICD-10-CM

## 2024-06-12 LAB
25(OH)D3 SERPL-MCNC: 9.6 NG/ML
ALBUMIN SERPL ELPH-MCNC: 4.1 G/DL
ALP BLD-CCNC: 87 U/L
ALT SERPL-CCNC: 12 U/L
ANION GAP SERPL CALC-SCNC: 12 MMOL/L
APPEARANCE: CLEAR
AST SERPL-CCNC: 8 U/L
BACTERIA: NEGATIVE /HPF
BILIRUB SERPL-MCNC: 0.4 MG/DL
BILIRUBIN URINE: NEGATIVE
BLOOD URINE: NEGATIVE
BUN SERPL-MCNC: 15 MG/DL
C TRACH RRNA SPEC QL NAA+PROBE: NOT DETECTED
CALCIUM SERPL-MCNC: 9.2 MG/DL
CAST: 0 /LPF
CHLORIDE SERPL-SCNC: 104 MMOL/L
CHOLEST SERPL-MCNC: 182 MG/DL
CO2 SERPL-SCNC: 26 MMOL/L
COLOR: YELLOW
CREAT SERPL-MCNC: 0.94 MG/DL
EGFR: 108 ML/MIN/1.73M2
EPITHELIAL CELLS: 0 /HPF
ESTIMATED AVERAGE GLUCOSE: 94 MG/DL
GLUCOSE QUALITATIVE U: NEGATIVE MG/DL
GLUCOSE SERPL-MCNC: 92 MG/DL
HBA1C MFR BLD HPLC: 4.9 %
HBV CORE IGG+IGM SER QL: NONREACTIVE
HBV SURFACE AB SER QL: REACTIVE
HBV SURFACE AG SER QL: NONREACTIVE
HCT VFR BLD CALC: 39.9 %
HCV AB SER QL: NONREACTIVE
HCV S/CO RATIO: 0.09 S/CO
HDLC SERPL-MCNC: 38 MG/DL
HGB BLD-MCNC: 11.6 G/DL
KETONES URINE: NEGATIVE MG/DL
LDLC SERPL CALC-MCNC: 132 MG/DL
LEUKOCYTE ESTERASE URINE: NEGATIVE
MCHC RBC-ENTMCNC: 25.4 PG
MCHC RBC-ENTMCNC: 29.1 GM/DL
MCV RBC AUTO: 87.5 FL
MICROSCOPIC-UA: NORMAL
N GONORRHOEA RRNA SPEC QL NAA+PROBE: NOT DETECTED
NITRITE URINE: NEGATIVE
NONHDLC SERPL-MCNC: 144 MG/DL
PH URINE: 5.5
PLATELET # BLD AUTO: 327 K/UL
POTASSIUM SERPL-SCNC: 4.6 MMOL/L
PROT SERPL-MCNC: 7.1 G/DL
PROTEIN URINE: NEGATIVE MG/DL
RBC # BLD: 4.56 M/UL
RBC # FLD: 14.6 %
RED BLOOD CELLS URINE: 0 /HPF
SODIUM SERPL-SCNC: 142 MMOL/L
SOURCE AMPLIFICATION: NORMAL
SPECIFIC GRAVITY URINE: 1.02
T PALLIDUM AB SER QL IA: NEGATIVE
TRIGL SERPL-MCNC: 64 MG/DL
TSH SERPL-ACNC: 0.6 UIU/ML
UROBILINOGEN URINE: 0.2 MG/DL
WBC # FLD AUTO: 7.82 K/UL
WHITE BLOOD CELLS URINE: 0 /HPF

## 2024-06-12 RX ORDER — ERGOCALCIFEROL 1.25 MG/1
1.25 MG CAPSULE, LIQUID FILLED ORAL
Qty: 4 | Refills: 1 | Status: ACTIVE | COMMUNITY
Start: 2024-06-12 | End: 1900-01-01

## 2024-06-18 LAB
BASOPHILS # BLD AUTO: 0.06 K/UL
BASOPHILS NFR BLD AUTO: 0.9 %
EOSINOPHIL # BLD AUTO: 0.18 K/UL
EOSINOPHIL NFR BLD AUTO: 2.6 %
FERRITIN SERPL-MCNC: 149 NG/ML
FOLATE SERPL-MCNC: 6.5 NG/ML
HCT VFR BLD CALC: 40.6 %
HGB BLD-MCNC: 11.6 G/DL
IRON SATN MFR SERPL: 19 %
IRON SERPL-MCNC: 46 UG/DL
LYMPHOCYTES # BLD AUTO: 2.01 K/UL
LYMPHOCYTES NFR BLD AUTO: 28.7 %
MCHC RBC-ENTMCNC: 24.8 PG
MCHC RBC-ENTMCNC: 28.6 GM/DL
MCV RBC AUTO: 86.8 FL
MONOCYTES # BLD AUTO: 0.43 K/UL
MONOCYTES NFR BLD AUTO: 6.1 %
MSMEAR: NORMAL
NEUTROPHILS # BLD AUTO: 4.19 K/UL
NEUTROPHILS NFR BLD AUTO: 60 %
PLAT MORPH BLD: NORMAL
PLATELET # BLD AUTO: 346 K/UL
RBC # BLD: 4.68 M/UL
RBC # FLD: 14.7 %
RBC BLD AUTO: NORMAL
TIBC SERPL-MCNC: 238 UG/DL
UIBC SERPL-MCNC: 192 UG/DL
VARIANT LYMPHS # BLD MANUAL: 1.7 %
VIT B12 SERPL-MCNC: 368 PG/ML
WBC # FLD AUTO: 6.99 K/UL

## 2024-06-19 ENCOUNTER — APPOINTMENT (OUTPATIENT)
Dept: BARIATRICS | Facility: CLINIC | Age: 34
End: 2024-06-19
Payer: MEDICAID

## 2024-06-19 VITALS
BODY MASS INDEX: 45.1 KG/M2 | SYSTOLIC BLOOD PRESSURE: 141 MMHG | WEIGHT: 315 LBS | OXYGEN SATURATION: 98 % | HEIGHT: 70 IN | DIASTOLIC BLOOD PRESSURE: 89 MMHG | HEART RATE: 86 BPM | TEMPERATURE: 97.2 F | RESPIRATION RATE: 16 BRPM

## 2024-06-19 DIAGNOSIS — Z78.9 OTHER SPECIFIED HEALTH STATUS: ICD-10-CM

## 2024-06-19 DIAGNOSIS — Z00.00 ENCOUNTER FOR GENERAL ADULT MEDICAL EXAMINATION W/OUT ABNORMAL FINDINGS: ICD-10-CM

## 2024-06-19 DIAGNOSIS — E66.01 MORBID (SEVERE) OBESITY DUE TO EXCESS CALORIES: ICD-10-CM

## 2024-06-19 PROCEDURE — 99204 OFFICE O/P NEW MOD 45 MIN: CPT

## 2024-06-19 PROCEDURE — 99214 OFFICE O/P EST MOD 30 MIN: CPT

## 2024-06-19 NOTE — HISTORY OF PRESENT ILLNESS
[de-identified] : Patient is a 34 year old gentleman with history of asthma, lung infiltrate, morbid obesity (BMI 54) who presents for evaluation of bariatric surgery. The patient has been overweight for more than 5 years and has tried multiple diet regimens which resulted in weight loss that was regained in the months following the diet. The patient has also tried fad diets to lose weight without success. Denies fevers, chills, chest pain, dyspnea, dysuria, hematochezia, melena. At time of evaluation, afebrile, hemodynamically stable, abdomen obese, soft, nontender, nondistended, no rebound or guarding. He currently follows with Pulmonology, on inhaler breo ellipta and PRN albuterol . Denies any significant reflux symptoms or heartburn.

## 2024-06-19 NOTE — ASSESSMENT
[FreeTextEntry1] : It was my pleasure to interact with Mr. MAUREEN PRABHAKAR today. In summary, Mr. MAUREEN PRABHAKAR has morbid obesity refractory to medical and dietary efforts for which He could benefit from weight loss surgery.  We discussed in detail the Rikki-en-Y gastric bypass, sleeve gastrectomy, and modified duodenal switch (JASWINDER/SIPS). He understood that these procedures are done primarily minimally invasively (laparoscopically or robotically), but that there is a possibility of conversion to laparotomy. In this particular case, with their body mass index we discussed realistic expectations with respect to weight loss.  Approximately 50% of excess weight will be lost if He has gastric bypass or sleeve gastrectomy, or, approximately 60% if He  has JASWINDER/SIPS.  In order to maintain weight loss or lose more weight, He will be required to modify diet and exercise habits (the "tool" concept of weight loss surgery).  We discussed the necessity for continuous, life-long medical care following surgery and the necessity for taking mineral and vitamin supplements daily for the rest of life. We discussed the importance of high protein diet and daily exercise for maximal success.  We discussed complications that may follow bariatric surgery that include, but are not limited to, respiratory problems, pneumonia, thrombophlebitis, and pulmonary embolus.  We also discussed intra-abdominal complications including anastomotic leak, intra-abdominal infections, portal vein thrombosis and death that may result from bariatric surgery.  We discussed that there may be other complications related to a specific type of surgery, such as that gastric bypass patients may have severe dumping secondary to dietary indiscretion. With respect to sleeve gastrectomy we discussed the chances of having refractory GERD that may require intervention in the future including conversion to gastric bypass. We also discussed postoperative DVT prophylaxis to decrease the incidence of deep vein thrombosis when indicated.   The prolonged discussion (greater than 45 minutes) centered primarily on the indications for surgery and evaluation of the risk/benefit ratio for Mr. MAUREEN PRABHAKAR and other weight loss alternatives. I answered all questions about bariatric surgery and possible complications to the best of my ability.  Once the preoperative evaluation is complete, I will review the chart and schedule the patient for a follow-up visit in order to answer any questions prior to scheduling surgery.  Plan: Nutrition, Psych evaluations Med, pulmonary evaluations UGI Sleeve vs. RYGB  I, Dr. Sinai Vee, spent 50 minutes with the patient >50% counseling/coordination of care including, reviewing the patient's history, performing an examination, reviewing relevant labs and radiographic imaging, reviewing PCP and consultant notes, discussion of medical and surgical management of the diagnosis as well as associated risks and benefits, and completing documentation.

## 2024-06-24 ENCOUNTER — APPOINTMENT (OUTPATIENT)
Dept: BARIATRICS | Facility: CLINIC | Age: 34
End: 2024-06-24

## 2024-06-25 ENCOUNTER — APPOINTMENT (OUTPATIENT)
Dept: BARIATRICS | Facility: CLINIC | Age: 34
End: 2024-06-25
Payer: MEDICAID

## 2024-06-25 PROCEDURE — 97802 MEDICAL NUTRITION INDIV IN: CPT | Mod: NC,93

## 2024-07-30 ENCOUNTER — APPOINTMENT (OUTPATIENT)
Dept: BARIATRICS | Facility: CLINIC | Age: 34
End: 2024-07-30
Payer: MEDICAID

## 2024-07-30 PROCEDURE — 97803 MED NUTRITION INDIV SUBSEQ: CPT | Mod: NC,93

## 2024-11-11 ENCOUNTER — APPOINTMENT (OUTPATIENT)
Dept: PULMONOLOGY | Facility: CLINIC | Age: 34
End: 2024-11-11
Payer: MEDICAID

## 2024-11-11 VITALS
BODY MASS INDEX: 45.1 KG/M2 | TEMPERATURE: 97.3 F | HEIGHT: 70 IN | OXYGEN SATURATION: 95 % | SYSTOLIC BLOOD PRESSURE: 145 MMHG | DIASTOLIC BLOOD PRESSURE: 98 MMHG | WEIGHT: 315 LBS | HEART RATE: 90 BPM

## 2024-11-11 DIAGNOSIS — E66.01 MORBID (SEVERE) OBESITY DUE TO EXCESS CALORIES: ICD-10-CM

## 2024-11-11 DIAGNOSIS — J45.909 UNSPECIFIED ASTHMA, UNCOMPLICATED: ICD-10-CM

## 2024-11-11 PROCEDURE — G2211 COMPLEX E/M VISIT ADD ON: CPT | Mod: NC

## 2024-11-11 PROCEDURE — 99214 OFFICE O/P EST MOD 30 MIN: CPT

## 2025-01-24 ENCOUNTER — NON-APPOINTMENT (OUTPATIENT)
Age: 35
End: 2025-01-24

## 2025-02-12 NOTE — ED ADULT TRIAGE NOTE - RESPIRATORY RATE (BREATHS/MIN)
Pt given discharge instructions, patient education, 3 prescriptions and follow up information. Pt verbalizes understanding. All questions answered. Pt discharged to home in private vehicle, ambulatory. Pt A&Ox4, RA and pain controlled.   
20

## 2025-02-17 NOTE — ED PROVIDER NOTE - HIV OFFER
Pt denies all cardiac symptoms or concerns since [facility] ER visit.   Previously Declined (within the last year)

## 2025-05-12 ENCOUNTER — APPOINTMENT (OUTPATIENT)
Dept: PULMONOLOGY | Facility: CLINIC | Age: 35
End: 2025-05-12